# Patient Record
Sex: MALE | URBAN - METROPOLITAN AREA
[De-identification: names, ages, dates, MRNs, and addresses within clinical notes are randomized per-mention and may not be internally consistent; named-entity substitution may affect disease eponyms.]

---
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
## 2025-01-03 NOTE — PROCEDURE: MEDICATION COUNSELING
Samples Given: BIANCA Effaclar gel, Dr. Lachelle sabillon Discontinue Regimen: Isotretinoin 80 mg Qd, finish current supply Detail Level: Zone Continue Regimen: Cetaphil wash \\nNiacinamide cream bid Initiate Treatment: Adapalene gel QHS over moisturizer Xolair Pregnancy And Lactation Text: This medication is Pregnancy Category B and is considered safe during pregnancy. This medication is excreted in breast milk.